# Patient Record
Sex: MALE | Race: WHITE
[De-identification: names, ages, dates, MRNs, and addresses within clinical notes are randomized per-mention and may not be internally consistent; named-entity substitution may affect disease eponyms.]

---

## 2017-06-11 NOTE — ER DOCUMENT REPORT
ED Medical Screen (RME)





- General


TRAVEL OUTSIDE OF THE [place] IN LAST 30 DAYS: No





<ALOK BENITEZ - Last Filed: 06/11/17 19:09>





<FLO BEAVERS - Last Filed: 06/11/17 20:33>





- General


Chief Complaint: Shortness Of Breath


Stated Complaint: SHORTNESS OF BREATH


Time Seen by Provider: 06/11/17 19:02


Notes: 


Patient says he has been having difficulty with his breathing for about a 

month.  He took some Mucinex cough medication and after couple weeks that 

problem cleared up.  Then, it came back a couple of weeks ago.  He had a cough 

and when he coughs it feels like phlegm breaks loose and then he feels like he 

is losing his breath.  He has been producing yellow phlegm initially followed 

by just clear phlegm now.  I had any fever.  Has a history of asthma but does 

not feel he is having asthma attacks.  Patient does not smoke.  Patient says he 

has had some intermittent ankle swelling bilaterally, but it has preceded his 

current illness.  Denies any leg pain or history of blood clots.  Patient 

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  Has acid 

reflux. (ALOK BENITEZ)





- Related Data


Allergies/Adverse Reactions: 


 





No Known Allergies Allergy (Verified 06/11/17 18:18)


 











Past Medical History


Renal/ Medical History: Denies: Hx Peritoneal Dialysis


GI Medical History: Reports: Hx Gastroesophageal Reflux Disease


Musculoskeltal Medical History: Reports Hx Musculoskeletal Trauma


Traumatic Medical History: Reports: Hx Fractures


Past Surgical History: Reports: Hx Orthopedic Surgery





- Immunizations


Immunizations up to date: Yes


Hx Diphtheria, Pertussis, Tetanus Vaccination: Yes





<ALOK BENITEZ - Last Filed: 06/11/17 19:09>





Course





- Laboratory


Result Diagrams: 


 06/11/17 19:30





 06/11/17 19:30





<FLO BEAVERS - Last Filed: 06/11/17 20:33>





- Vital Signs


Vital signs: 





 











Temp Pulse Resp BP Pulse Ox


 


 97.6 F   95   14   153/95 H  96 


 


 06/11/17 18:18  06/11/17 18:18  06/11/17 18:18  06/11/17 18:18  06/11/17 18:18














- Laboratory


Laboratory results interpreted by me: 





 











  06/11/17





  19:30


 


WBC  10.8 H














Doctor's Discharge





<ALOK BENITEZ - Last Filed: 06/11/17 19:09>





<FLO BEAVERS - Last Filed: 06/11/17 20:33>





- Discharge


Clinical Impression: 


 Cough, Shortness of breath, Postnasal drip





Condition: Stable


Disposition: HOME, SELF-CARE


Additional Instructions: 


No pneumonia is seen on x-ray.  Examination is consistent with an upper 

respiratory source of your symptoms along with postnasal drip.


Because of ongoing symptoms we are treating you with a course of azithromycin 

in addition to the prednisone for suspected bronchitis source.  Also consider 

antiallergy such as Zyrtec or Allegra over-the-counter for postnasal drip and 

your symptoms.  Follow-up with primary care.  Return to emergency department 

for concerning or worsening symptoms including rapid or labored breathing, 

worsening shortness of breath, spiking fever, chest pain, or any other 

concerning symptoms.





Prescriptions: 


Azithromycin [Zithromax 250 mg Tablet] 250 mg PO ASDIR PRN #6 tablet


 PRN Reason: 


Prednisone [Deltasone 10 mg Tablet] 10 mg PO ASDIR PRN #21 tablet


 PRN Reason:

## 2017-06-11 NOTE — RADIOLOGY REPORT (SQ)
EXAM DESCRIPTION:  CHEST PA/LAT



COMPLETED DATE/TIME:  6/11/2017 7:42 pm



REASON FOR STUDY:  Cough and feeling short of breath.



COMPARISON:  None.



NUMBER OF VIEWS:  Two view.



TECHNIQUE:  Frontal and lateral radiographic views of the chest acquired.



LIMITATIONS:  None.



FINDINGS:  LUNGS AND PLEURA: Low lung volumes.  No opacities, masses or pneumothorax.  No pleural eff
usion.

MEDIASTINUM AND HILAR STRUCTURES: No masses.  No contour abnormalities.

HEART AND VASCULAR STRUCTURES: Heart normal in size and contour.  No evidence for failure.

BONES: No acute findings.

HARDWARE: None in the chest.

OTHER: No other significant finding.



IMPRESSION:  LOW LUNG VOLUMES.  NO SIGNIFICANT RADIOGRAPHIC FINDING IN THE CHEST.



TECHNICAL DOCUMENTATION:  JOB ID:  [id number]

 2011 [employer]- All Rights Reserved

## 2017-06-12 NOTE — ER DOCUMENT REPORT
ED General





- General


Chief Complaint: Shortness Of Breath


Stated Complaint: SHORTNESS OF BREATH


Time Seen by Provider: 06/11/17 19:02


Notes: 


Patient is a 36-year-old male who comes emergency department for chief 

complaint of difficulty breathing, symptoms started almost 1 month ago, he 

states that he then improved but then 2 weeks ago his symptoms returned.  He 

states he has coughing episodes and today during the coughing episodes he felt 

more short of breath than usual.  He denies any fever, he states he had yellow 

phlegm but now he is just coughing up clear phlegm.  He does not smoke.  He 

states he has a history of asthma in the past.  He states that he has noticed 

his legs swelling on both sides, however this started before his cough and 

shortness of breath.  He denies any history or family history of blood clots.  

He denies any recent surgery or long distance travel.  He is a  but 

does short trips only.


TRAVEL OUTSIDE OF THE [place] IN LAST 30 DAYS: No





- Related Data


Allergies/Adverse Reactions: 


 





No Known Allergies Allergy (Verified 06/11/17 18:18)


 











Past Medical History





- General


Information source: Patient





- Social History


Smoking Status: Never Smoker


Frequency of alcohol use: Occasional


Drug Abuse: None


Family History: Arthritis, CAD, DM, Hyperlipidemia, Hypertension, Malignancy, 

Thyroid Disfunction


Patient has suicidal ideation: No


Patient has homicidal ideation: No


Pulmonary Medical History: Reports: Hx Asthma


Renal/ Medical History: Denies: Hx Peritoneal Dialysis


GI Medical History: Reports: Hx Gastroesophageal Reflux Disease


Musculoskeltal Medical History: Reports Hx Musculoskeletal Trauma


Traumatic Medical History: Reports: Hx Fractures


Past Surgical History: Reports: Hx Orthopedic Surgery





- Immunizations


Immunizations up to date: Yes


Hx Diphtheria, Pertussis, Tetanus Vaccination: Yes





Review of Systems





- Review of Systems


Constitutional: No symptoms reported


EENT: No symptoms reported


Cardiovascular: See HPI


Respiratory: See HPI


Gastrointestinal: No symptoms reported


Genitourinary: No symptoms reported


Male Genitourinary: No symptoms reported


Musculoskeletal: No symptoms reported


Skin: No symptoms reported


Hematologic/Lymphatic: No symptoms reported


Neurological/Psychological: No symptoms reported





Physical Exam





- Vital signs


Vitals: 


 











Temp Pulse Resp BP Pulse Ox


 


 97.6 F   95   14   153/95 H  96 


 


 06/11/17 18:18  06/11/17 18:18  06/11/17 18:18  06/11/17 18:18  06/11/17 18:18











Interpretation: Normal





- General


General appearance: Appears well


In distress: None - Patient does not appear to be in any distress





- HEENT


Head: Normocephalic, Atraumatic


Eyes: Normal


Conjunctiva: Normal


Extraocular movements intact: Yes


Eyelashes: Normal


Pupils: PERRL


Tympanic membrane: Normal


Sinus: Normal


Nasal: Normal


Mouth/Lips: Normal


Mucous membranes: Normal


Pharynx: Erythema - There is mild erythema with what appears to be postnasal 

drip


Neck: Normal





- Respiratory


Respiratory status: No respiratory distress.  No: Respiratory distress, 

Tachypnea


Chest status: Nontender.  No: Tender


Breath sounds: Nonproductive cough.  No: Decreased air movement, Wheezing


Chest palpation: Normal





- Cardiovascular


Rhythm: Regular.  No: Tachycardia


Heart sounds: Normal auscultation, S1 appreciated, S2 appreciated


Murmur: No





- Abdominal


Inspection: Normal


Distension: No distension


Bowel sounds: Normal


Tenderness: Nontender


Organomegaly: No organomegaly





- Back


Back: Normal, Nontender





- Extremities


General upper extremity: Normal inspection, Nontender, Normal color, Normal ROM

, Normal temperature


General lower extremity: Normal inspection, Nontender, Normal color, Normal ROM

, Normal temperature, Normal weight bearing.  No: Tabatha's sign





- Neurological


Neuro grossly intact: Yes


Cognition: Normal


Orientation: AAOx4


[place] Coma Scale Eye Opening: Spontaneous


[place] Coma Scale Verbal: Oriented


[place] Coma Scale Motor: Obeys Commands


[place] Coma Scale Total: 15


Speech: Normal


Motor strength normal: LUE, RUE, LLE, RLE


Sensory: Normal





- Psychological


Associated symptoms: Normal affect, Normal mood





- Skin


Skin Temperature: Warm


Skin Moisture: Dry


Skin Color: Normal





Course





- Re-evaluation


Re-evalutation: 


Laboratory workup is unremarkable, patient's physical exam shows evidence of 

postnasal drip and some irritated coughing but no other concerning 

abnormalities.  No lower extremity swelling.  Very low suspicion of PE based on 

no long distance travel, no smoking, no lower extremity swelling, no history of 

blood clot with patient for family members, and no surgery.  Patient not 

currently reporting any shortness of breath.  Because symptoms have been going 

on greater than 1 week I did agree to cover with antibiotic although discussed 

with patient that this is very likely bronchitis.  Discussed follow-up with 

primary care, discussed return precautions, patient states understanding and 

agreement.





- Vital Signs


Vital signs: 


 











Temp Pulse Resp BP Pulse Ox


 


 98.0 F   85   18   145/93 H  97 


 


 06/11/17 20:42  06/11/17 20:42  06/11/17 20:42  06/11/17 20:42  06/11/17 20:42














- Laboratory


Result Diagrams: 


 06/11/17 19:30





 06/11/17 19:30


Laboratory results interpreted by me: 


 











  06/11/17





  19:30


 


WBC  10.8 H














Discharge





- Discharge


Clinical Impression: 


 Cough, Shortness of breath, Postnasal drip





Condition: Stable


Disposition: HOME, SELF-CARE


Additional Instructions: 


No pneumonia is seen on x-ray.  Examination is consistent with an upper 

respiratory source of your symptoms along with postnasal drip.


Because of ongoing symptoms we are treating you with a course of azithromycin 

in addition to the prednisone for suspected bronchitis source.  Also consider 

antiallergy such as Zyrtec or Allegra over-the-counter for postnasal drip and 

your symptoms.  Follow-up with primary care.  Return to emergency department 

for concerning or worsening symptoms including rapid or labored breathing, 

worsening shortness of breath, spiking fever, chest pain, or any other 

concerning symptoms.





Prescriptions: 


Azithromycin [Zithromax 250 mg Tablet] 250 mg PO ASDIR PRN #6 tablet


 PRN Reason: 


Prednisone [Deltasone 10 mg Tablet] 10 mg PO ASDIR PRN #21 tablet


 PRN Reason: 


Forms:  Elevated Blood Pressure

## 2017-06-12 NOTE — ER DOCUMENT REPORT
ED Respiratory Problem





- General


Chief Complaint: Shortness Of Breath


Stated Complaint: DIFFICULTY BREATHING


Time Seen by Provider: 06/12/17 12:15


Mode of Arrival: Ambulatory


Information source: Patient


TRAVEL OUTSIDE OF THE [place] IN LAST 30 DAYS: No





- HPI


Patient complains to provider of: Cough, Short of breath


Onset: Other - 1 month


Quality of pain: No pain


Severity: Mild


Short of Breath: Mild


Chest pain/discomfort: Tightness, Worse with deep breaths


Cough: Productive


Sputum amount: Small


Sputum color: Clear


Sputum consistency: Mucoid


Associated symptoms: Congestion, Cough, Short of breath


Similar symptoms previously: Yes


Recently seen / treated by doctor: Yes


Notes: 


Patient is a 36-year-old male who presents back to the emergency room today for 

complaints of shortness of breath with chest tightness and a cough that has 

been persistent for 1 month, the cough is occasionally productive of clear 

phlegm, there is been no fever, no injury, he does report the back of his 

throat feels dry and he has had nasal congestion as well, he was seen in this 

emergency room yesterday for these complaints, was diagnosed with likely 

bronchitis and started on antibiotics which she has been unable to fill yet as 

he was discharged late yesterday evening and the pharmacies were closed, he 

reports feeling no change in symptoms since being seen last night, patient 

reports that at some point in time during his evaluation the possibility of a 

pulmonary embolus was discussed with him and now he is concerned that that 

could be a possibility for his symptoms, he is not a smoker he does work as a 

 traveling up to 4 hours at a time but does get out of the vehicle 

once he arrives at his destination, denies any leg pain or calf pain, no 

history of DVTs previously or PE





- Related Data


Allergies/Adverse Reactions: 


 





No Known Allergies Allergy (Verified 06/12/17 12:01)


 











Past Medical History





- General


Information source: Patient





- Social History


Smoking Status: Never Smoker


Family History: Arthritis, CAD, DM, Hyperlipidemia, Hypertension, Malignancy, 

Thyroid Disfunction


Patient has suicidal ideation: No


Patient has homicidal ideation: No


Pulmonary Medical History: Reports: Hx Asthma


Renal/ Medical History: Denies: Hx Peritoneal Dialysis


GI Medical History: Reports: Hx Gastroesophageal Reflux Disease


Musculoskeltal Medical History: Reports Hx Musculoskeletal Trauma


Traumatic Medical History: Reports: Hx Fractures


Past Surgical History: Reports: Hx Orthopedic Surgery





- Immunizations


Immunizations up to date: Yes


Hx Diphtheria, Pertussis, Tetanus Vaccination: Yes





Review of Systems





- Review of Systems


Constitutional: No symptoms reported


EENT: No symptoms reported


Cardiovascular: No symptoms reported


Respiratory: See HPI


Gastrointestinal: No symptoms reported


Genitourinary: No symptoms reported


Male Genitourinary: No symptoms reported


Musculoskeletal: No symptoms reported


Skin: No symptoms reported


Hematologic/Lymphatic: No symptoms reported


Neurological/Psychological: No symptoms reported


-: Yes All other systems reviewed and negative





Physical Exam





- Vital signs


Vitals: 


 











Temp Pulse Resp BP Pulse Ox


 


 98.7 F   91   20   161/104 H  98 


 


 06/12/17 12:01 06/12/17 12:01 06/12/17 12:01 06/12/17 12:01 06/12/17 12:01











Interpretation: Normal





- General


General appearance: Appears well, Alert





- HEENT


Head: Normocephalic, Atraumatic


Eyes: Normal


Pupils: PERRL





- Respiratory


Respiratory status: No respiratory distress.  No: Respiratory distress


Chest status: Nontender


Breath sounds: Normal.  No: Decreased air movement


Chest palpation: Normal





- Cardiovascular


Rhythm: Regular


Heart sounds: Normal auscultation


Murmur: No





- Abdominal


Inspection: Normal, Morbidly Obese


Distension: No distension


Bowel sounds: Normal


Tenderness: Nontender


Organomegaly: No organomegaly





- Back


Back: Normal, Nontender





- Extremities


General upper extremity: Normal inspection, Nontender, Normal color, Normal ROM

, Normal temperature


General lower extremity: Normal inspection, Nontender, Normal color, Normal ROM

, Normal temperature, Normal weight bearing.  No: Tabatha's sign


Calf: Nontender





- Neurological


Neuro grossly intact: Yes


Cognition: Normal


Orientation: AAOx4


[place] Coma Scale Eye Opening: Spontaneous


[place] Coma Scale Verbal: Oriented


[place] Coma Scale Motor: Obeys Commands


[place] Coma Scale Total: 15


Speech: Normal


Motor strength normal: LUE, RUE, LLE, RLE


Sensory: Normal





- Psychological


Associated symptoms: Normal affect, Normal mood





- Skin


Skin Temperature: Warm


Skin Moisture: Dry


Skin Color: Normal





Course





- Vital Signs


Vital signs: 


 











Temp Pulse Resp BP Pulse Ox


 


 98.7 F   91   20   161/104 H  98 


 


 06/12/17 12:01  06/12/17 12:01 06/12/17 12:01 06/12/17 12:01 06/12/17 12:01














- EKG Interpretation by Me


EKG shows normal: Sinus rhythm


Rate: Normal


Rhythm: NSR





Discharge





- Discharge


Clinical Impression: 


 Bronchitis





Condition: Stable


Disposition: HOME, SELF-CARE


Instructions:  Bronchitis (Critical access hospital)


Additional Instructions: 


Follow up with your primary care provider in one to 2 days.  Return to the 

emergency room immediately if symptoms worsen or any additional concerns.


Forms:  Return to Work

## 2018-01-16 NOTE — ER DOCUMENT REPORT
ED GI/





- General


Chief Complaint: Abdominal Pain


Stated Complaint: LOWER ABDOMINAL PAIN


Time Seen by Provider: 01/16/18 16:40


Mode of Arrival: Ambulatory


Notes: 


Patient is a 37-year-old male comes emergency department for chief complaint of 

lower abdominal pain mainly in his right side.  He states that he is having an 

intermittent cramping feelings since this morning, he had occasional sharp 

stabbing pain in the area.  He states he had pain and pressure in the area when 

he had a couple bowel movements earlier.  He denies any current pain.  Denies 

nausea vomiting, fever or chills, hematuria, flank pain, or trauma.  Past 

medical history of obesity, GERD, orthopedic surgery.





TRAVEL OUTSIDE OF THE [place] IN LAST 30 DAYS: No





- Related Data


Allergies/Adverse Reactions: 


 





No Known Allergies Allergy (Verified 01/16/18 14:56)


 











Past Medical History





- General


Information source: Patient





- Social History


Smoking Status: Never Smoker


Chew tobacco use (# tins/day): No


Frequency of alcohol use: Occasional


Drug Abuse: None


Lives with: Family


Family History: Arthritis, CAD, DM, Hyperlipidemia, Hypertension, Malignancy, 

Thyroid Disfunction


Patient has suicidal ideation: No


Patient has homicidal ideation: No


Pulmonary Medical History: Reports: Hx Asthma


Renal/ Medical History: Denies: Hx Peritoneal Dialysis


GI Medical History: Reports: Hx Gastroesophageal Reflux Disease


Musculoskeltal Medical History: Reports Hx Musculoskeletal Trauma


Traumatic Medical History: Reports: Hx Fractures


Past Surgical History: Reports: Hx Orthopedic Surgery





- Immunizations


Immunizations up to date: Yes


Hx Diphtheria, Pertussis, Tetanus Vaccination: Yes





Review of Systems





- Review of Systems


Constitutional: No symptoms reported


EENT: No symptoms reported


Cardiovascular: No symptoms reported


Respiratory: No symptoms reported


Gastrointestinal: See HPI


Genitourinary: No symptoms reported


Male Genitourinary: No symptoms reported


Musculoskeletal: No symptoms reported


Skin: No symptoms reported


Hematologic/Lymphatic: No symptoms reported


Neurological/Psychological: No symptoms reported





Physical Exam





- Vital signs


Vitals: 


 











Temp Pulse Resp BP Pulse Ox


 


 98.5 F   101 H  18   185/97 H  97 


 


 01/16/18 15:05  01/16/18 15:05  01/16/18 15:05  01/16/18 15:05  01/16/18 15:05











Interpretation: Normal





- General


General appearance: Appears well, Alert


In distress: None - Patient alert, well-appearing, conversational





- HEENT


Head: Normocephalic, Atraumatic


Eyes: Normal


Pupils: PERRL





- Respiratory


Respiratory status: No respiratory distress


Chest status: Nontender


Breath sounds: Normal


Chest palpation: Normal





- Cardiovascular


Rhythm: Regular


Heart sounds: Normal auscultation


Murmur: No





- Abdominal


Inspection: Normal


Distension: No distension


Bowel sounds: Normal


Tenderness: Nontender - Completely nontender abdomen, soft, no rebound 

tenderness, no rigidity.  No: Tender, McBurney's point, Guarding





- Back


Back: Normal, Nontender.  No: Tender, CVA tenderness





- Extremities


General upper extremity: Normal inspection, Nontender, Normal color, Normal ROM

, Normal temperature


General lower extremity: Normal inspection, Nontender, Normal color, Normal ROM

, Normal temperature, Normal weight bearing.  No: Tabatha's sign





- Neurological


Neuro grossly intact: Yes


Cognition: Normal


Orientation: AAOx4


[place] Coma Scale Eye Opening: Spontaneous


[place] Coma Scale Verbal: Oriented


[place] Coma Scale Motor: Obeys Commands


[place] Coma Scale Total: 15


Speech: Normal


Motor strength normal: LUE, RUE, LLE, RLE


Sensory: Normal





- Psychological


Associated symptoms: Normal affect, Normal mood





- Skin


Skin Temperature: Warm


Skin Moisture: Dry


Skin Color: Normal





Course





- Re-evaluation


Re-evalutation: 


Patient is alert and well-appearing on my exam.  He has a soft nontender 

abdomen.  CBC shows minimal leukocytosis with no elevation of neutrophils or 

bandemia.  Nonspecific.  Chemistry unremarkable.  Urinalysis unremarkable with 

no hematuria or infection.  CAT scan had already been ordered and performed 

from triage, this was reviewed.  Shows fatty infiltration of the liver, normal 

appendix, normal bowel, no acute abnormality.  I feel this is consistent with 

patient's examination.  Symptoms have been intermittent, no vomiting, no fever, 

patient currently asymptomatic and has a very unremarkable abdominal exam.  I 

have very low suspicion of an acute abdominal surgical abnormality.  Unsure of 

patient's exact etiology at this time.  Discussed results including fatty 

liver.  Patient will be discharged with strict return precautions, I discussed 

these, these were provided with his discharge instructions, patient states 

understanding and agreement.





- Vital Signs


Vital signs: 


 











Temp Pulse Resp BP Pulse Ox


 


 98.0 F   89   19   151/54 H  94 


 


 01/16/18 19:09  01/16/18 19:09  01/16/18 19:09  01/16/18 19:09  01/16/18 19:09














- Laboratory


Result Diagrams: 


 01/16/18 16:53





 01/16/18 16:53


Laboratory results interpreted by me: 


 











  01/16/18





  16:53


 


WBC  11.8 H














Discharge





- Discharge


Clinical Impression: 


 Right lower quadrant pain





Condition: Stable


Disposition: HOME, SELF-CARE


Additional Instructions: 


Your examination, lab work, and CAT scan did not show any concerning 

abnormality at this time.  Increase fluids, eat plenty of fiber, follow-up with 

primary care. Return to the ED if you worsen, see additional instructions 

below. 


_________________________





Observation for Appendicitis





     At this time, the abdominal pain does not seem to be appendicitis. Our 

next "test" will be passage of time.  If you have early appendicitis, signs 

will appear to help us make the diagnosis.


     Most of the time, the pain goes away.  


     Unless the pain is gone, you should come back for a recheck.  This is 

usually done in 8 to 12 hours.  Be sure you understand your follow-up 

instructions.


     Come back immediately if: 


(1) the pain becomes much more severe and sharply increases with movement or 

coughing, 


(2) vomiting becomes frequent, 


(3) there is blood in the vomit, urine, or bowel movements, 


(4) there are shaking chills or fever, or 


(5) the abdomen becomes more distended or swollen.

## 2018-01-16 NOTE — RADIOLOGY REPORT (SQ)
EXAM DESCRIPTION:  CT ABD/PELVIS WITH IV ONLY



COMPLETED DATE/TIME:  1/16/2018 6:43 pm



REASON FOR STUDY:  rlq abd pain



COMPARISON:  None.



TECHNIQUE:  CT scan of the abdomen and pelvis performed using helical scanning technique with dynamic
 intravenous contrast injection.  No oral contrast. Images reviewed with lung, soft tissue, and bone 
windows. Reconstructed coronal and sagittal MPR images reviewed. Delayed images for evaluation of the
 urinary system also acquired. All images stored on PACS.

All CT scanners at this facility use dose modulation, iterative reconstruction, and/or weight based d
osing when appropriate to reduce radiation dose to as low as reasonably achievable (ALARA).

[facility]: Dose Right  [facility]: CareDose    [facility]: Dose Right    [facility]: Teradose 4D    [facility]: Smart Technologies



CONTRAST TYPE AND DOSE:  contrast/concentration: Isovue 370.00 mg/ml; Total Contrast Delivered: 100.0
 ml; Total Saline Delivered: 45.1 ml



RENAL FUNCTION:  None required. The patient is less than 50 years old.



RADIATION DOSE:  CT Rad equipment meets quality standard of care and radiation dose reduction techniq
ues were employed. CTDIvol: 30.0 mGy. DLP: 3723 mGy-cm..



LIMITATIONS:  None.



FINDINGS:  LOWER CHEST: No significant findings. No nodules or infiltrates.

LIVER: Normal size. No masses.  No dilated ducts.  There is fatty infiltration of the liver.

SPLEEN: Normal size. No focal lesions.

PANCREAS: No masses. No significant calcifications. No adjacent inflammation or peripancreatic fluid 
collections. Pancreatic duct not dilated.

GALLBLADDER: No identified stones by CT criteria. No inflammatory changes to suggest cholecystitis.

ADRENAL GLANDS: No significant masses or asymmetry.

RIGHT KIDNEY AND URETER: No solid masses.   No significant calcifications.   No hydronephrosis or hyd
roureter.

LEFT KIDNEY AND URETER: No solid masses.   No significant calcifications.   No hydronephrosis or hydr
oureter.

AORTA AND VESSELS: No aneurysm. No dissection. Renal arteries, SMA, celiac without stenosis.

RETROPERITONEUM: No retroperitoneal adenopathy, hemorrhage or masses.

BOWEL AND PERITONEAL CAVITY: No masses or inflammatory changes. No free fluid or peritoneal masses.

APPENDIX: Normal.

PELVIS: No mass.  No free fluid. Normal bladder.

ABDOMINAL WALL: No masses. No hernias.

BONES: No significant or acute findings.

OTHER: No other significant finding.



IMPRESSION:  NO SIGNIFICANT OR ACUTE FINDING IN THE ABDOMEN OR PELVIS ON CT SCAN WITH IV CONTRAST.



TECHNICAL DOCUMENTATION:  JOB ID:  [id number]

Quality ID # [id number]: Final reports with documentation of one or more dose reduction techniques (e.g., Au
tomated exposure control, adjustment of the mA and/or kV according to patient size, use of iterative 
reconstruction technique)

 2011 [employer]- All Rights Reserved

## 2018-01-16 NOTE — ER DOCUMENT REPORT
ED Medical Screen (RME)





- General


Chief Complaint: Abdominal Pain


Stated Complaint: LOWER ABDOMINAL PAIN


Time Seen by Provider: 01/16/18 16:40


Mode of Arrival: Ambulatory


Information source: Patient


Notes: 





36 yo morbidly obese male with RLQ abd. pain that gets worse with bm's today, 

and after 2 sandwichs at lunch. Onset 0400. No hx stones. No fever. No groin  

or testiculat pain. 


TRAVEL OUTSIDE OF THE [place] IN LAST 30 DAYS: No





- Related Data


Allergies/Adverse Reactions: 


 





No Known Allergies Allergy (Verified 01/16/18 14:56)


 











Past Medical History





- Social History


Chew tobacco use (# tins/day): No


Frequency of alcohol use: Occasional


Drug Abuse: None


Pulmonary Medical History: Reports: Hx Asthma


Renal/ Medical History: Denies: Hx Peritoneal Dialysis


GI Medical History: Reports: Hx Gastroesophageal Reflux Disease


Musculoskeltal Medical History: Reports Hx Musculoskeletal Trauma


Traumatic Medical History: Reports: Hx Fractures


Past Surgical History: Reports: Hx Orthopedic Surgery





- Immunizations


Immunizations up to date: Yes


Hx Diphtheria, Pertussis, Tetanus Vaccination: Yes





Physical Exam





- Vital signs


Vitals: 





 











Temp Pulse Resp BP Pulse Ox


 


 98.5 F   101 H  18   185/97 H  97 


 


 01/16/18 15:05  01/16/18 15:05  01/16/18 15:05  01/16/18 15:05  01/16/18 15:05














Course





- Vital Signs


Vital signs: 





 











Temp Pulse Resp BP Pulse Ox


 


 98.5 F   101 H  18   185/97 H  97 


 


 01/16/18 15:05  01/16/18 15:05  01/16/18 15:05  01/16/18 15:05  01/16/18 15:05

## 2018-03-14 NOTE — ER DOCUMENT REPORT
ED Skin Rash/Insect Bite/Abscs





- General


Chief Complaint: Skin Problem


Stated Complaint: SKIN PROBLEM


Time Seen by Provider: 03/14/18 21:06


Information source: Patient


TRAVEL OUTSIDE OF THE [place] IN LAST 30 DAYS: No





- HPI


Patient complains to provider of: Skin rash/lesion


Notes: 





Patient is here with complaints of rash.  He states that he was pulling weeds 

and working in his garden on Sunday and Monday started at some itching to his 

arms.  Yesterday he noticed some rash to his arms.  Today he noticed some rash 

under his eyes.  No blurred or loss vision.  No pain.  No fever.  No nausea, 

vomiting, diarrhea.  Chest pain or shortness of breath.  He denies any new soaps

, detergents, lotions, medications.  He has no other complaints.





- Related Data


Allergies/Adverse Reactions: 


 





No Known Allergies Allergy (Verified 01/16/18 14:56)


 











Past Medical History





- Social History


Smoking Status: Never Smoker


Chew tobacco use (# tins/day): No


Frequency of alcohol use: None


Drug Abuse: None


Family History: Arthritis, CAD, DM, Hyperlipidemia, Hypertension, Malignancy, 

Thyroid Disfunction


Patient has suicidal ideation: No


Patient has homicidal ideation: No


Pulmonary Medical History: Reports: Hx Asthma


Renal/ Medical History: Denies: Hx Peritoneal Dialysis


GI Medical History: Reports: Hx Gastroesophageal Reflux Disease


Musculoskeltal Medical History: Reports Hx Musculoskeletal Trauma


Traumatic Medical History: Reports: Hx Fractures


Past Surgical History: Reports: Hx Orthopedic Surgery





- Immunizations


Immunizations up to date: Yes


Hx Diphtheria, Pertussis, Tetanus Vaccination: Yes





Review of Systems





- Review of Systems


-: Yes All other systems reviewed and negative





Physical Exam





- Vital signs


Vitals: 





 











Temp Pulse Resp BP Pulse Ox


 


 98.4 F   76   20   156/96 H  96 


 


 03/14/18 20:55  03/14/18 20:55  03/14/18 20:55  03/14/18 20:55  03/14/18 20:55














- Notes


Notes: 





GENERAL: alert, cooperative, nontoxic, no distress.


HEAD: normocephalic, atraumatic


EYES: conjunctiva pink without discharge, mild redness and swelling to the 

lower lids.  No tenderness.


EARS: no external swelling, no external redness


NOSE: atraumatic, no external swelling


MOUTH/THROAT: mucous membranes moist and pink


NECK: soft, supple, full range of motion, no meningismus.


CHEST: no distress, lungs clear and equal throughout.  No wheezing, rales, 

rhonchi.


CARDIAC: regular rate and rhythm, no murmur, normal capillary refill, normal 

pulses.  


BACK: full range of motion, no CVA tenderness.


EXTREMITIES: full range of motion of all extremities.  No redness, no swelling.


NEURO: alert and oriented 3, no focal deficits, full range of motion of all 

extremities.


PYSCH: appropriate mood, affect.  Patient is cooperative.


SKIN: pink, warm, dry, nonspecific papular rash to the bilateral arms with 

vesicular linear rash to the bilateral arms.  No surrounding erythema or 

tenderness.  Mild erythema and swelling to the bilateral lower eyelids..








Course





- Re-evaluation


Re-evalutation: 





03/14/18 22:11


Patient is nontoxic appearing with stable vitals.  The patient was working in 

his garden on Sunday developed itching Monday and rash Tuesday he now has a 

rash to his arms and under his eyelids.  Rashes consistent with contact 

dermatitis.  There is no signs of secondary infection.  Patient is in no 

respiratory distress.  Remainder of his exam exam unremarkable.  He does not 

have diabetes.  Due to the fact that is involving his eyes, I will place him on 

oral steroid taper.  He was instructed to do over-the-counter antihistamines.  

Follow-up if not improving in the next 5-7 days, sooner for worsening symptoms, 

high fever, pain, drainage, any further concerns.





The patient's emergency department workup and current diagnosis were explained 

to the patient and or family.  Follow-up instructions were provided.  

Medications if prescribed were discussed. Instructions for when to return to 

the emergency department including specific  worrisome symptoms were discussed 

with the patient and/or family.





The patient is noted to have elevated blood pressure during today's emergency 

department visit.  The patient was informed of this finding.  The patient was 

instructed that this may be related to pre-hypertension and requires further 

evaluation with a primary care provider.  The patient has no hypertensive 

symptoms at this time.





- Vital Signs


Vital signs: 





 











Temp Pulse Resp BP Pulse Ox


 


 98.4 F   76   20   156/96 H  96 


 


 03/14/18 20:55  03/14/18 20:55  03/14/18 20:55  03/14/18 20:55  03/14/18 20:55














Discharge





- Discharge


Clinical Impression: 


Contact dermatitis


Qualifiers:


 Contact dermatitis type: allergic Contact dermatitis trigger: non-food plants 

Qualified Code(s): L23.7 - Allergic contact dermatitis due to plants, except 

food





Condition: Stable


Disposition: HOME, SELF-CARE


Instructions:  Contact Dermatitis ([facility])


Additional Instructions: 


Take medications as prescribed.  Take over-the-counter antihistamine such as 

Claritin or Benadryl to help with itching.  Keep rash clean and dry.  Follow-up 

if not better in 5-7 days, sooner for worsening symptoms, high fever, redness, 

drainage, pain, difficulty breathing or swallowing, or for any further concerns.





Your blood pressure was elevated during today's visit.  Have this rechecked 

with your doctor.


Prescriptions: 


Prednisone 5 mg PO ASDIR 18 Days  tab.ds.pk


Forms:  Elevated Blood Pressure, Smoking Cessation Education


Referrals: 


[facility] [Provider Group] - Follow up as needed

## 2018-03-20 NOTE — ER DOCUMENT REPORT
ED Skin Rash/Insect Bite/Abscs





- General


Chief Complaint: Rash


Stated Complaint: POSSIBLE RASH


Time Seen by Provider: 03/20/18 17:19


Mode of Arrival: Ambulatory


Information source: Patient


TRAVEL OUTSIDE OF THE [place] IN LAST 30 DAYS: No





- HPI


Patient complains to provider of: Skin rash/lesion


Notes: 





Patient is here with complaints of rash.  He was seen here a few days ago with 

a rash to his arms and under his eyes and was placed on prednisone for possible 

contact dermatitis as he tells me he was cleaning out tashia in the garden just 

prior to the rash starting.  He has been on a prednisone taper but states the 

rash seems to be getting worse.  Rashes now spread to his hands his abdomen and 

up his arms.  His eye swelling has improved.  States the rash is very itchy.  

Seems to be spreading.  No fevers.  No nausea, vomiting, diarrhea.  No 

difficulty breathing or swallowing.  No chest pain or shortness of breath.  No 

known sick contacts.  No new soaps, detergents, lotions, medications.  No other 

complaints.





- Related Data


Allergies/Adverse Reactions: 


 





No Known Allergies Allergy (Verified 03/20/18 16:17)


 











Past Medical History





- Social History


Smoking Status: Never Smoker


Chew tobacco use (# tins/day): No


Frequency of alcohol use: Occasional


Drug Abuse: None


Family History: Arthritis, CAD, DM, Hyperlipidemia, Hypertension, Malignancy, 

Thyroid Disfunction


Patient has suicidal ideation: No


Patient has homicidal ideation: No


Pulmonary Medical History: Reports: Hx Asthma


Renal/ Medical History: Denies: Hx Peritoneal Dialysis


GI Medical History: Reports: Hx Gastroesophageal Reflux Disease


Musculoskeltal Medical History: Reports Hx Musculoskeletal Trauma


Traumatic Medical History: Reports: Hx Fractures


Past Surgical History: Reports: Hx Orthopedic Surgery





- Immunizations


Immunizations up to date: Yes


Hx Diphtheria, Pertussis, Tetanus Vaccination: Yes





Review of Systems





- Review of Systems


-: Yes All other systems reviewed and negative





Physical Exam





- Vital signs


Vitals: 





 











Temp Pulse Resp BP Pulse Ox


 


 97.5 F   77   18   152/82 H  96 


 


 03/20/18 16:20  03/20/18 16:20  03/20/18 16:20  03/20/18 16:20  03/20/18 16:20














- Notes


Notes: 





GENERAL: alert, cooperative, nontoxic, no distress.


HEAD: normocephalic, atraumatic


EYES: conjunctiva pink without discharge, no external redness or swelling.


EARS: no external swelling, no external redness


NOSE: atraumatic, no external swelling


MOUTH/THROAT: mucous membranes moist and pink


NECK: soft, supple, full range of motion, no meningismus.


CHEST: no distress, lungs clear and equal throughout.  No wheezing, rales, 

rhonchi.


CARDIAC: regular rate and rhythm, no murmur, normal capillary refill, normal 

pulses.  


BACK: full range of motion, no CVA tenderness.


EXTREMITIES: full range of motion of all extremities.  No redness, no swelling.


NEURO: alert and oriented 3, no focal deficits, full range of motion of all 

extremities.


PYSCH: appropriate mood, affect.  Patient is cooperative.


SKIN: pink, warm, dry, fine red papular rash to the forearms, the dorsum of the 

hands, between the fingers, the folds of the arms, folds of the abdomen.  No 

surrounding cellulitis or redness.  No tenderness.








Course





- Re-evaluation


Re-evalutation: 





03/20/18 17:37


Patient is nontoxic appearing with stable vitals.  The patient is here with 

complaints of rash.  He was seen here a few days ago for what was possible to 

be contact dermatitis and was placed on a steroid taper.  He states that the 

rash seems to be getting worse.  He is noted to have a rash to the forearms, 

dorsum of his hands, between his fingers, the flexor portions of his arms as 

well as his abdomen.  Rashes consistent with possible scabies.  Instructed that 

he continue his prednisone taper.  I will discharge him home with Elimite.  He 

is instructed to wash everything in hot water and vacuum all furniture and 

carpet.  Continue taking Benadryl or Claritin as needed for itching.  Follow-up 

if not better in 1 week, sooner for worsening symptoms, high fever, difficult 

to breathing or swallowing, or for any further concerns.





The patient's emergency department workup and current diagnosis were explained 

to the patient and or family.  Follow-up instructions were provided.  

Medications if prescribed were discussed. Instructions for when to return to 

the emergency department including specific  worrisome symptoms were discussed 

with the patient and/or family.





The patient is noted to have elevated blood pressure during today's emergency 

department visit.  The patient was informed of this finding.  The patient was 

instructed that this may be related to pre-hypertension and requires further 

evaluation with a primary care provider.  The patient has no hypertensive 

symptoms at this time.





- Vital Signs


Vital signs: 





 











Temp Pulse Resp BP Pulse Ox


 


 97.5 F   77   18   152/82 H  96 


 


 03/20/18 16:20  03/20/18 16:20  03/20/18 16:20  03/20/18 16:20  03/20/18 16:20














Discharge





- Discharge


Clinical Impression: 


 Scabies





Condition: Stable


Disposition: HOME, SELF-CARE


Instructions:  Scabies ([facility])


Additional Instructions: 


His medication as prescribed.  Finish a prednisone taper.  Take Benadryl or 

Claritin as needed for itching.  Wash everything in hot water.  Vacuum all 

furniture and carpet.  Follow-up if not better in 1 week, sooner for worsening 

symptoms, high fever, difficulty breathing or swallowing, or for any further 

concerns.





Your blood pressure was elevated during today's visit.  Have this rechecked 

with your doctor.


Prescriptions: 


Permethrin [Elimite] 60 gm TP ONCE #120 cream.gm.


Forms:  Elevated Blood Pressure, Smoking Cessation Education


Referrals: 


[facility] [Provider Group] - Follow up as needed

## 2018-05-07 ENCOUNTER — HOSPITAL ENCOUNTER (EMERGENCY)
Dept: HOSPITAL 62 - ER | Age: 38
Discharge: HOME | End: 2018-05-07
Payer: SELF-PAY

## 2018-05-07 VITALS — SYSTOLIC BLOOD PRESSURE: 173 MMHG | DIASTOLIC BLOOD PRESSURE: 88 MMHG

## 2018-05-07 DIAGNOSIS — H60.501: Primary | ICD-10-CM

## 2018-05-07 DIAGNOSIS — R03.0: ICD-10-CM

## 2018-05-07 DIAGNOSIS — J45.909: ICD-10-CM

## 2018-05-07 DIAGNOSIS — H92.01: ICD-10-CM

## 2018-05-07 PROCEDURE — 99282 EMERGENCY DEPT VISIT SF MDM: CPT

## 2018-05-07 NOTE — ER DOCUMENT REPORT
ED ENT





- General


Chief Complaint: Ear Pain


Stated Complaint: RIGHT EAR PAIN


Time Seen by Provider: 05/07/18 17:04


Mode of Arrival: Ambulatory


Information source: Patient


TRAVEL OUTSIDE OF THE [place] IN LAST 30 DAYS: No





- HPI


Patient complains to provider of: Ear problem


Onset: Last week


Notes: 





Right ear pain for the last several days.  No injury.  No fever.  No nasal 

congestion.  No drainage.  He does not use Q-tips.  He denies any recent 

swimming.  Denies any redness or swelling around the outside of the ear.  No 

sore throat.  No difficulty breathing or swallowing.  No nausea, vomiting, 

diarrhea.  No dizziness.  No neck pain.  No other complaints at this time.  

Pain is worse with touching the ear, better with ibuprofen.  He does complain 

of some decreased hearing in this ear.








- Related Data


Allergies/Adverse Reactions: 


 





No Known Allergies Allergy (Verified 05/07/18 16:42)


 











Past Medical History





- Social History


Smoking Status: Never Smoker


Family History: Arthritis, CAD, DM, Hyperlipidemia, Hypertension, Malignancy, 

Thyroid Disfunction


Patient has suicidal ideation: No


Patient has homicidal ideation: No


Pulmonary Medical History: Reports: Hx Asthma


Renal/ Medical History: Denies: Hx Peritoneal Dialysis


GI Medical History: Reports: Hx Gastroesophageal Reflux Disease


Musculoskeltal Medical History: Reports Hx Musculoskeletal Trauma


Traumatic Medical History: Reports: Hx Fractures


Past Surgical History: Reports: Hx Orthopedic Surgery





- Immunizations


Immunizations up to date: Yes


Hx Diphtheria, Pertussis, Tetanus Vaccination: Yes





Review of Systems





- Review of Systems


-: Yes All other systems reviewed and negative





Physical Exam





- Vital signs


Vitals: 





 











Temp Pulse Resp BP Pulse Ox


 


 99.1 F   84   18   173/88 H  97 


 


 05/07/18 16:49  05/07/18 16:49  05/07/18 16:49  05/07/18 16:49  05/07/18 16:49














- Notes


Notes: 





GENERAL: alert, cooperative, nontoxic, no distress.


HEAD: normocephalic, atraumatic


EYES: conjunctiva pink without discharge, no external redness or swelling.


EARS: no external swelling, no external redness, no mastoid redness, swelling, 

tenderness.  Left canal normal.  Right ear canal with swelling and redness.  No 

drainage.  Tenderness with movement of the tragus as well as the auricle.  TMs 

pearly gray, no redness, no bulging, normal landmarks, no perforation.


NOSE: atraumatic, no external swelling. clear rhinorrhea noted.


MOUTH/THROAT: mucous membranes moist and pink, posterior pharynx without 

erythema, swelling, exudate. No trismus or drooling.


NECK: soft, supple, full range of motion, no meningismus.


CHEST: no distress, lungs clear and equal throughout.  No wheezing, rales, 

rhonchi.


CARDIAC: regular rate and rhythm, no murmur, normal capillary refill, normal 

pulses.  No peripheral edema noted.


BACK: full range of motion, no CVA tenderness.


EXTREMITIES: full range of motion of all extremities.  No redness, no swelling.


NEURO: alert and oriented A&O3, no focal deficits, full range of motion of all 

extremities.


PYSCH: appropriate mood, affect.  Patient is cooperative.


SKIN: pink, warm, dry, no rash.








Course





- Re-evaluation


Re-evalutation: 





05/07/18 17:42


Patient is nontoxic appearing with stable vitals.  Here with complaints of 

right ear pain.  On exam he has a right otitis externa.  No perforation.  No 

otitis media.  No sign of mastoiditis.  The patient will be discharged home 

with Ciprodex eardrops.  He declined needing any prescription for pain 

medication, he states that he will take Tylenol Motrin as needed for pain.  

Follow-up if not improving in the next 2 days, sooner for worsening pain, fever

, redness or swelling around the outside of the ear, or for any further 

concerns.





The patient is noted to have elevated blood pressure during today's emergency 

department visit.  The patient was informed of this finding.  The patient was 

instructed that this may be related to pre-hypertension and requires further 

evaluation with a primary care provider.  The patient has no hypertensive 

symptoms at this time.





The patient's emergency department workup and current diagnosis were explained 

to the patient and or family.  Follow-up instructions were provided.  

Medications if prescribed were discussed. Instructions for when to return to 

the emergency department including specific  worrisome symptoms were discussed 

with the patient and/or family.





- Vital Signs


Vital signs: 





 











Temp Pulse Resp BP Pulse Ox


 


 99.1 F   84   18   173/88 H  97 


 


 05/07/18 16:49  05/07/18 16:49  05/07/18 16:49  05/07/18 16:49  05/07/18 16:49














Discharge





- Discharge


Clinical Impression: 


Otitis externa


Qualifiers:


 Otitis externa type: unspecified type Chronicity: acute Laterality: right 

Qualified Code(s): H60.501 - Unspecified acute noninfective otitis externa, 

right ear





Condition: Stable


Disposition: HOME, SELF-CARE


Instructions:  Use of Ear Drops ([facility]), Otitis Externa ([facility]), Family Physicians 

/ Practices


Additional Instructions: 


Take medications as prescribed.  Take 600 mg of ibuprofen every 6 hours and 

1000 mg of Tylenol every 6 hours as needed for pain.  Follow-up if not 

improving in the next 2 days, sooner for increasing pain, fever, redness, 

swelling, drainage, persistent vomiting, redness or swelling around the outside 

of the ear, or for any further concerns.





The patient is noted to have elevated blood pressure during today's emergency 

department visit.  The patient was informed of this finding.  The patient was 

instructed that this may be related to pre-hypertension and requires further 

evaluation with a primary care provider.  The patient has no hypertensive 

symptoms at this time.





The patient's emergency department workup and current diagnosis were explained 

to the patient and or family.  Follow-up instructions were provided.  

Medications if prescribed were discussed. Instructions for when to return to 

the emergency department including specific  worrisome symptoms were discussed 

with the patient and/or family.


Prescriptions: 


Ciprofloxacin HCl/Dexameth [Ciprodex Otic Suspension 7.5 ml Bottle] 4 drop OT 

BID #1 bottle


Forms:  Elevated Blood Pressure, Smoking Cessation Education


Referrals: 


[facility] [Provider Group] - Follow up as needed

## 2018-05-28 ENCOUNTER — HOSPITAL ENCOUNTER (EMERGENCY)
Dept: HOSPITAL 62 - ER | Age: 38
Discharge: HOME | End: 2018-05-28
Payer: SELF-PAY

## 2018-05-28 VITALS — DIASTOLIC BLOOD PRESSURE: 94 MMHG | SYSTOLIC BLOOD PRESSURE: 168 MMHG

## 2018-05-28 DIAGNOSIS — R51: ICD-10-CM

## 2018-05-28 DIAGNOSIS — R09.82: ICD-10-CM

## 2018-05-28 DIAGNOSIS — J45.909: ICD-10-CM

## 2018-05-28 DIAGNOSIS — R09.81: ICD-10-CM

## 2018-05-28 DIAGNOSIS — J01.90: Primary | ICD-10-CM

## 2018-05-28 DIAGNOSIS — R03.0: ICD-10-CM

## 2018-05-28 PROCEDURE — 99283 EMERGENCY DEPT VISIT LOW MDM: CPT

## 2018-05-28 NOTE — ER DOCUMENT REPORT
HPI





- HPI


Patient complains to provider of: Left sinus pain


Onset: Other


Onset/Duration: Gradual - 5 days


Pain Level: 4


Context: 





37-year-old obese non-smoker complaining of left nasal congestion and frontal 

and maxillary sinus pain.  Some postnasal drip.  No cough.  No fever or chills.

  No history of deviated septum or chronic sinus infection.  Usually has some 

elevated blood pressure but does not take any blood pressure medication he does 

not have a primary care doctor because he does not have insurance.





Past Medical History





- General


Information source: Patient





- Social History


Smoking Status: Never Smoker


Frequency of alcohol use: None


Drug Abuse: None


Lives with: Spouse/Significant other


Family History: Arthritis, CAD, DM, Hyperlipidemia, Hypertension, Malignancy, 

Thyroid Disfunction


Pulmonary Medical History: Reports: Hx Asthma


Renal/ Medical History: Denies: Hx Peritoneal Dialysis


GI Medical History: Reports: Hx Gastroesophageal Reflux Disease


Musculoskeltal Medical History: Reports Hx Musculoskeletal Trauma


Traumatic Medical History: Reports: Hx Fractures


Past Surgical History: Reports: Hx Orthopedic Surgery





- Immunizations


Immunizations up to date: Yes


Hx Diphtheria, Pertussis, Tetanus Vaccination: Yes





Vertical Provider Document





- CONSTITUTIONAL


Agree With Documented VS: Yes


Exam Limitations: No Limitations


General Appearance: No Apparent Distress





- INFECTION CONTROL


TRAVEL OUTSIDE OF THE [place] IN LAST 30 DAYS: No





- HEENT


HEENT: Atraumatic, Normocephalic, Pharyngeal Erythema - Minimal.  negative: 

Conjuctival Injection


Notes: 





Boggy left naris with tenderness left frontal and maxillary sinus





- NECK


Neck: Supple.  negative: Lymphadenopathy-Left, Lymphadenopathy-Right





- RESPIRATORY


Respiratory: Breath Sounds Normal, No Respiratory Distress





- CARDIOVASCULAR


Cardiovascular: Regular Rate, Regular Rhythm





Course





- Re-evaluation


Re-evalutation: 





05/28/18 09:37


I had a long conversation about what the blood pressure can do to his body and 

ways to decrease calorie intake per day to lose weight.  Referred him to family 

practice doctor [facility].





- Vital Signs


Vital signs: 


 











Temp Pulse Resp BP Pulse Ox


 


 99.5 F   91   14   168/94 H  96 


 


 05/28/18 09:17  05/28/18 09:17  05/28/18 09:17  05/28/18 09:17  05/28/18 09:17














Discharge





- Discharge


Clinical Impression: 


Sinusitis


Qualifiers:


 Sinusitis location: unspecified location Chronicity: acute Recurrence: non-

recurrent Qualified Code(s): J01.90 - Acute sinusitis, unspecified





Condition: Good


Disposition: HOME, SELF-CARE


Instructions:  Augmentin ([facility]), Sinusitis ([facility]), High Blood Pressure ([facility]), 

[facility], Family Physicians / Practices


Additional Instructions: 


Continue saline nasal spray


Stop drinking sweet tea and limit the quantity of servings of food


See [facility] or family practice doctor for blood pressure 

recheck and possible medication


Warm compress to sinuses


Augmentin for 10 days


Return to the emergency room for worsening symptoms


Prescriptions: 


Amoxicillin/Potassium Clav [Augmentin 875-125 Tablet] 1 each PO BID #20 tablet

## 2018-07-07 ENCOUNTER — HOSPITAL ENCOUNTER (EMERGENCY)
Dept: HOSPITAL 62 - ER | Age: 38
Discharge: HOME | End: 2018-07-07
Payer: SELF-PAY

## 2018-07-07 VITALS — SYSTOLIC BLOOD PRESSURE: 156 MMHG | DIASTOLIC BLOOD PRESSURE: 94 MMHG

## 2018-07-07 DIAGNOSIS — H60.92: Primary | ICD-10-CM

## 2018-07-07 PROCEDURE — 99282 EMERGENCY DEPT VISIT SF MDM: CPT

## 2018-07-07 NOTE — ER DOCUMENT REPORT
HPI





- HPI


Patient complains to provider of: left ear pain


Onset: Last week


Onset/Duration: Persistent, Worse


Pain Level: 3


Context: 





36 yo male c/o pain left ear. Itches at times. no recent URI.


Exacerbated by: Denies


Relieved by: Denies


Similar symptoms previously: No


Recently seen / treated by doctor: No





- ROS


ROS below otherwise negative: Yes


Systems Reviewed and Negative: Yes All other systems reviewed and negative





- CONSTITUTIONAL


Constitutional: DENIES: Fever, Chills





- EENT


EENT: REPORTS: Ear Pain - LEFT





Past Medical History





- General


Information source: Patient





- Social History


Smoking Status: Never Smoker


Chew tobacco use (# tins/day): No


Frequency of alcohol use: None


Drug Abuse: None


Lives with: Spouse/Significant other


Family History: Arthritis, CAD, DM, Hyperlipidemia, Hypertension, Malignancy, 

Thyroid Disfunction


Patient has suicidal ideation: No


Patient has homicidal ideation: No


Pulmonary Medical History: Reports: Hx Asthma


Renal/ Medical History: Denies: Hx Peritoneal Dialysis


GI Medical History: Reports: Hx Gastroesophageal Reflux Disease


Musculoskeltal Medical History: Reports Hx Musculoskeletal Trauma


Traumatic Medical History: Reports: Hx Fractures


Past Surgical History: Reports: Hx Orthopedic Surgery - LEFT COLLARBONE





- Immunizations


Immunizations up to date: Yes


Hx Diphtheria, Pertussis, Tetanus Vaccination: Yes





Vertical Provider Document





- CONSTITUTIONAL


Agree With Documented VS: Yes


Exam Limitations: No Limitations


General Appearance: No Apparent Distress





- INFECTION CONTROL


TRAVEL OUTSIDE OF THE [place] IN LAST 30 DAYS: No





- HEENT


HEENT: negative: Tympanic Membrane Red


Notes: 





mld red tender left ear canal, no swelling





- NECK


Neck: Supple.  negative: Lymphadenopathy-Left, Lymphadenopathy-Right





- RESPIRATORY


Respiratory: Breath Sounds Normal, No Respiratory Distress





- CARDIOVASCULAR


Cardiovascular: Regular Rate, Regular Rhythm





- NEURO


Level of Consciousness: Alert





- DERM


Integumentary: No Rash





Course





- Vital Signs


Vital signs: 


 











Temp Pulse Resp BP Pulse Ox


 


 98.7 F   79   18   156/94 H  97 


 


 07/07/18 08:38  07/07/18 08:38  07/07/18 08:38  07/07/18 08:38  07/07/18 08:38














Discharge





- Discharge


Clinical Impression: 


 Mild left otitis externa





Condition: Good


Disposition: HOME, SELF-CARE


Instructions:  Use of Ear Drops ([facility]), Otitis Externa ([facility])


Additional Instructions: 


ear drops 4 times a day


Return to the emergency room if symptoms worsen


Prescriptions: 


Gentamicin Sulfate 2 drp AS QID #5 ml

## 2019-08-26 ENCOUNTER — HOSPITAL ENCOUNTER (EMERGENCY)
Dept: HOSPITAL 62 - ER | Age: 39
Discharge: HOME | End: 2019-08-26
Payer: SELF-PAY

## 2019-08-26 VITALS — DIASTOLIC BLOOD PRESSURE: 88 MMHG | SYSTOLIC BLOOD PRESSURE: 181 MMHG

## 2019-08-26 DIAGNOSIS — H93.8X2: Primary | ICD-10-CM

## 2019-08-26 DIAGNOSIS — J45.909: ICD-10-CM

## 2019-08-26 PROCEDURE — 99282 EMERGENCY DEPT VISIT SF MDM: CPT

## 2019-08-26 NOTE — ER DOCUMENT REPORT
HPI





- HPI


Patient complains to provider of: Ear abscess


Time Seen by Provider: 08/26/19 09:11


Onset: Other - 3Days


Quality of pain: Achy


Pain Level: 3


Context: 





This 38-year-old male presents emergency department with complaints of left ear 

possible abscess.  Reports he noticed a serna approximately 3 days ago.  His

girlfriend squeezed it last night got a lot of white stringy discharge out of 

the area.  Reports the area still is a little tender with touch or when he 

smiles.  Denies fever vomiting diarrhea.  Denies history of MRSA.  Works as a 

 does not wear ear buds. 


Associated Symptoms: None


Exacerbated by: Denies


Relieved by: Denies


Similar symptoms previously: No


Recently seen / treated by doctor: No





Past Medical History





- General


Information source: Patient





- Social History


Smoking Status: Unknown if Ever Smoked


Cigarette use (# per day): No


Frequency of alcohol use: None


Drug Abuse: None


Occupation: 


Family History: Arthritis, CAD, DM, Hyperlipidemia, Hypertension, Malignancy, 

Thyroid Disfunction


Pulmonary Medical History: Reports: Hx Asthma


Renal/ Medical History: Denies: Hx Peritoneal Dialysis


GI Medical History: Reports: Hx Gastroesophageal Reflux Disease


Musculoskeletal Medical History: Reports Hx Musculoskeletal Trauma


Traumatic Medical History: Reports: Hx Fractures


Past Surgical History: Reports: Hx Orthopedic Surgery - LEFT COLLARBONE





- Immunizations


Immunizations up to date: Yes


Hx Diphtheria, Pertussis, Tetanus Vaccination: Yes





Vertical Provider Document





- CONSTITUTIONAL


Agree With Documented VS: Yes


Exam Limitations: No Limitations


General Appearance: WD/WN, No Apparent Distress





- INFECTION CONTROL


TRAVEL OUTSIDE OF THE [place] IN LAST 30 DAYS: No





- HEENT


HEENT: Atraumatic, Normocephalic.  negative: Conjuctival Injection, Tympanic 

Membrane Red - small area of ecchymosis on helicis everardo/christine cynba area, no 

erythema no warmth no swelling no pustule no signs of an abscess, Tympanic 

Membrane Bulging





- NECK


Neck: Normal Inspection, Supple.  negative: Lymphadenopathy-Left, 

Lymphadenopathy-Right





- RESPIRATORY


Respiratory: Breath Sounds Normal, No Respiratory Distress





- CARDIOVASCULAR


Cardiovascular: Regular Rate





- MUSCULOSKELETAL/EXTREMETIES


Musculoskeletal/Extremeties: MAEW, FROM





- NEURO


Level of Consciousness: Awake, Alert, Appropriate


Motor/Sensory: No Motor Deficit





- DERM


Integumentary: Warm, Dry





Course





- Re-evaluation


Re-evalutation: 





08/26/19 09:27


38-year-old male presents emergency department with possible abscess in his left

ear.  Denies history of abscesses.  Denies MRSA.  No other complaints.  Site 

looks benign no signs of abdomen is little ecchymosis where area has been 

squeezed but no erythema no warmth no swelling no pustule.  Patient was 

instructed on signs and symptoms of abscess.  instructed to monitor area and 

return for abscess.  Verbalized understanding to all information














   Dictation of this chart was performed using voice recognition software; 

therefore, there may be some unintended grammatical errors.





- Vital Signs


Vital signs: 


                                        











Temp Pulse Resp BP Pulse Ox


 


 98.3 F   73   16   181/88 H  96 


 


 08/26/19 09:05  08/26/19 09:05  08/26/19 09:05  08/26/19 09:05  08/26/19 09:05














Discharge





- Discharge


Clinical Impression: 


 Irritation of left ear





Condition: Stable


Disposition: HOME, SELF-CARE


Additional Instructions: 


*You have been evaluated for ear irritation, possible abscess


*Monitor your ear for signs of an abscess such as redness swelling increased 

pain warmth pustule


*Tylenol or Motrin as indicated for pain


*Follow up with a primary care provider in 1 week for recheck or return to the 

emergency department as indicated


*Return to ED for worsening condition, changes, needs











Monitor your blood pressure. Your blood pressure was elevated today.  This may 

be because you were anxious, in pain or because you need medication.  It is 

important to follow up with your primary care provider for full evaluation.


Forms:  Elevated Blood Pressure

## 2020-01-08 ENCOUNTER — HOSPITAL ENCOUNTER (EMERGENCY)
Dept: HOSPITAL 62 - ER | Age: 40
Discharge: HOME | End: 2020-01-08
Payer: SELF-PAY

## 2020-01-08 VITALS — DIASTOLIC BLOOD PRESSURE: 90 MMHG | SYSTOLIC BLOOD PRESSURE: 161 MMHG

## 2020-01-08 DIAGNOSIS — R07.9: ICD-10-CM

## 2020-01-08 DIAGNOSIS — J45.909: ICD-10-CM

## 2020-01-08 DIAGNOSIS — R03.0: Primary | ICD-10-CM

## 2020-01-08 LAB
ADD MANUAL DIFF: NO
ALBUMIN SERPL-MCNC: 4.6 G/DL (ref 3.5–5)
ALP SERPL-CCNC: 75 U/L (ref 38–126)
ANION GAP SERPL CALC-SCNC: 11 MMOL/L (ref 5–19)
AST SERPL-CCNC: 44 U/L (ref 17–59)
BASOPHILS # BLD AUTO: 0 10^3/UL (ref 0–0.2)
BASOPHILS NFR BLD AUTO: 0.4 % (ref 0–2)
BILIRUB DIRECT SERPL-MCNC: 0.2 MG/DL (ref 0–0.4)
BILIRUB SERPL-MCNC: 0.8 MG/DL (ref 0.2–1.3)
BUN SERPL-MCNC: 11 MG/DL (ref 7–20)
CALCIUM: 9.9 MG/DL (ref 8.4–10.2)
CHLORIDE SERPL-SCNC: 102 MMOL/L (ref 98–107)
CO2 SERPL-SCNC: 26 MMOL/L (ref 22–30)
EOSINOPHIL # BLD AUTO: 0.1 10^3/UL (ref 0–0.6)
EOSINOPHIL NFR BLD AUTO: 0.8 % (ref 0–6)
ERYTHROCYTE [DISTWIDTH] IN BLOOD BY AUTOMATED COUNT: 13.1 % (ref 11.5–14)
GLUCOSE SERPL-MCNC: 85 MG/DL (ref 75–110)
HCT VFR BLD CALC: 42.6 % (ref 37.9–51)
HGB BLD-MCNC: 14.8 G/DL (ref 13.5–17)
LYMPHOCYTES # BLD AUTO: 2.6 10^3/UL (ref 0.5–4.7)
LYMPHOCYTES NFR BLD AUTO: 29.3 % (ref 13–45)
MCH RBC QN AUTO: 30.9 PG (ref 27–33.4)
MCHC RBC AUTO-ENTMCNC: 34.8 G/DL (ref 32–36)
MCV RBC AUTO: 89 FL (ref 80–97)
MONOCYTES # BLD AUTO: 0.5 10^3/UL (ref 0.1–1.4)
MONOCYTES NFR BLD AUTO: 6.1 % (ref 3–13)
NEUTROPHILS # BLD AUTO: 5.7 10^3/UL (ref 1.7–8.2)
NEUTS SEG NFR BLD AUTO: 63.4 % (ref 42–78)
PLATELET # BLD: 332 10^3/UL (ref 150–450)
POTASSIUM SERPL-SCNC: 4 MMOL/L (ref 3.6–5)
PROT SERPL-MCNC: 7.7 G/DL (ref 6.3–8.2)
RBC # BLD AUTO: 4.79 10^6/UL (ref 4.35–5.55)
TOTAL CELLS COUNTED % (AUTO): 100 %
WBC # BLD AUTO: 9 10^3/UL (ref 4–10.5)

## 2020-01-08 PROCEDURE — 71046 X-RAY EXAM CHEST 2 VIEWS: CPT

## 2020-01-08 PROCEDURE — 99285 EMERGENCY DEPT VISIT HI MDM: CPT

## 2020-01-08 PROCEDURE — 93010 ELECTROCARDIOGRAM REPORT: CPT

## 2020-01-08 PROCEDURE — 36415 COLL VENOUS BLD VENIPUNCTURE: CPT

## 2020-01-08 PROCEDURE — 85025 COMPLETE CBC W/AUTO DIFF WBC: CPT

## 2020-01-08 PROCEDURE — 80053 COMPREHEN METABOLIC PANEL: CPT

## 2020-01-08 PROCEDURE — 93005 ELECTROCARDIOGRAM TRACING: CPT

## 2020-01-08 PROCEDURE — 84484 ASSAY OF TROPONIN QUANT: CPT

## 2020-01-08 NOTE — ER DOCUMENT REPORT
ED Medical Screen (RME)





- General


Chief Complaint: Chest Pain


Stated Complaint: CHEST PAIN


Time Seen by Provider: 20 17:19


Mode of Arrival: Ambulatory


Information source: Patient


Notes: 





39-year-old morbidly obese  history of reflux presents to the 

emergency department with complaints of chest pain left side of his chest since 

this morning.  He reports feels uncomfortable.  Denies nausea fever vomiting 

diarrhea.  Denies pain radiating down his arm or up his neck.  Reports his 

father  of cardiac disease but he was in his 60s.  











[employer]


TRAVEL OUTSIDE OF THE [place] IN LAST 30 DAYS: No





- Related Data


Allergies/Adverse Reactions: 


                                        





No Known Allergies Allergy (Verified 20 17:14)


   








Home Medications: prilosec, baby aspirin, multivitamin





Past Medical History





- Social History


Chew tobacco use (# tins/day): No


Frequency of alcohol use: None


Drug Abuse: None


Pulmonary Medical History: Reports: Hx Asthma


Renal/ Medical History: Denies: Hx Peritoneal Dialysis


GI Medical History: Reports: Hx Gastroesophageal Reflux Disease


Musculoskeltal Medical History: Reports Hx Musculoskeletal Trauma


Traumatic Medical History: Reports: Hx Fractures


Past Surgical History: Reports: Hx Orthopedic Surgery - LEFT COLLARBONE





- Immunizations


Immunizations up to date: Yes


Hx Diphtheria, Pertussis, Tetanus Vaccination: Yes





Physical Exam





- Vital signs


Vitals: 





                                        











Temp Pulse Resp BP Pulse Ox


 


 98.2 F   85   16   179/112 H  98 


 


 20 16:08  20 16:08  20 16:08  20 16:08  20 16:08














Course





- Vital Signs


Vital signs: 





                                        











Temp Pulse Resp BP Pulse Ox


 


 98.2 F   85   16   179/112 H  98 


 


 20 16:08  20 16:08  20 16:08  20 16:08  20 16:08

## 2020-01-08 NOTE — RADIOLOGY REPORT (SQ)
EXAM DESCRIPTION:  CHEST 2 VIEWS



COMPLETED DATE/TIME:  1/8/2020 5:43 pm



REASON FOR STUDY:  cp



COMPARISON:  6/11/2017



EXAM PARAMETERS:  NUMBER OF VIEWS: two views

TECHNIQUE: Digital Frontal and Lateral radiographic views of the chest acquired.

RADIATION DOSE: NA

LIMITATIONS: none



FINDINGS:  LUNGS AND PLEURA: No opacities, masses or pneumothorax. No pleural effusion.

MEDIASTINUM AND HILAR STRUCTURES: No masses or contour abnormalities.

HEART AND VASCULAR STRUCTURES: Heart normal size.  No evidence for failure.

BONES: No acute findings.

HARDWARE: None in the chest.

OTHER: No other significant finding.



IMPRESSION:  NO ACUTE RADIOGRAPHIC FINDING IN THE CHEST.



TECHNICAL DOCUMENTATION:  JOB ID:  [id number]

 2011 [employer]- All Rights Reserved



Reading location - IP/workstation name: [email or web address]

## 2020-01-08 NOTE — ER DOCUMENT REPORT
ED General





- General


Chief Complaint: Chest Pain


Stated Complaint: CHEST PAIN


Time Seen by Provider: 01/08/20 17:19


Mode of Arrival: Ambulatory


TRAVEL OUTSIDE OF THE [place] IN LAST 30 DAYS: No





- HPI


Onset: Yesterday


Onset/Duration: Gradual


Severity: Moderate


Pain Level: 1


Context: 





39 year old male arrives with left sided chest pain almost like electricity that

lasts seconds in left sternal border.  No fever or chills.  No recent illness.  

No rash.  He denies associated symptoms - no sob or sweating or nausea or 

vomiting. 





- Related Data


Allergies/Adverse Reactions: 


                                        





No Known Allergies Allergy (Verified 01/08/20 17:14)


   








Home Medications: prilosec, baby aspirin, multivitamin





Past Medical History





- General


Information source: Patient





- Social History


Smoking Status: Never Smoker


Chew tobacco use (# tins/day): No


Frequency of alcohol use: None


Drug Abuse: None


Family History: Arthritis, CAD, DM, Hyperlipidemia, Hypertension, Malignancy, 

Thyroid Disfunction


Patient has suicidal ideation: No


Patient has homicidal ideation: No


Pulmonary Medical History: Reports: Hx Asthma


Renal/ Medical History: Denies: Hx Peritoneal Dialysis


GI Medical History: Reports: Hx Gastroesophageal Reflux Disease


Musculoskeletal Medical History: Reports Hx Musculoskeletal Trauma


Traumatic Medical History: Reports: Hx Fractures


Past Surgical History: Reports: Hx Orthopedic Surgery - LEFT COLLARBONE





- Immunizations


Immunizations up to date: Yes


Hx Diphtheria, Pertussis, Tetanus Vaccination: Yes





Review of Systems





- Review of Systems


Constitutional: No symptoms reported


EENT: No symptoms reported


Cardiovascular: No symptoms reported


Respiratory: No symptoms reported


Gastrointestinal: No symptoms reported


Genitourinary: No symptoms reported


Male Genitourinary: No symptoms reported


Musculoskeletal: No symptoms reported


Skin: No symptoms reported


Hematologic/Lymphatic: No symptoms reported


Neurological/Psychological: No symptoms reported





Physical Exam





- Vital signs


Vitals: 


                                        











Temp Pulse Resp BP Pulse Ox


 


 98.2 F   85   16   179/112 H  98 


 


 01/08/20 16:08  01/08/20 16:08  01/08/20 16:08  01/08/20 16:08  01/08/20 16:08











Interpretation: Normal





- General


General appearance: Appears well, Alert





- HEENT


Head: Normocephalic, Atraumatic


Eyes: Normal


Pupils: PERRL





- Respiratory


Respiratory status: No respiratory distress


Chest status: Nontender


Breath sounds: Normal


Chest palpation: Normal





- Cardiovascular


Rhythm: Regular


Heart sounds: Normal auscultation


Murmur: No





- Abdominal


Inspection: Normal


Distension: No distension


Bowel sounds: Normal


Tenderness: Nontender


Organomegaly: No organomegaly





- Back


Back: Normal, Nontender





- Extremities


General upper extremity: Normal inspection, Nontender, Normal color, Normal ROM,

Normal temperature


General lower extremity: Normal inspection, Nontender, Normal color, Normal ROM,

Normal temperature, Normal weight bearing.  No: Tabatha's sign





- Neurological


Neuro grossly intact: Yes


Cognition: Normal


Orientation: AAOx4


Ravin Coma Scale Eye Opening: Spontaneous


Ravin Coma Scale Verbal: Oriented


Ravin Coma Scale Motor: Obeys Commands


Fisher Coma Scale Total: 15


Speech: Normal


Motor strength normal: LUE, RUE, LLE, RLE


Sensory: Normal





- Psychological


Associated symptoms: Normal affect, Normal mood





- Skin


Skin Temperature: Warm


Skin Moisture: Dry


Skin Color: Normal





Course





- Re-evaluation


Re-evalutation: 





01/08/20 23:38


MDM  39 year old male with fleeting short duration chest pain at home.   Lasted 

seconds and occurred at 3 am today.  With several recurences but no pain here 

now.  I feel no second of enzymes is needed as the ekg and other ancillary 

studies are very reassuring. 





- Vital Signs


Vital signs: 


                                        











Temp Pulse Resp BP Pulse Ox


 


 97.9 F   68   41 H  150/91 H  99 


 


 01/08/20 23:33  01/08/20 22:47  01/08/20 23:15  01/08/20 23:15  01/08/20 23:15














- Laboratory


Result Diagrams: 


                                 01/08/20 18:11





                                 01/08/20 18:11





- Diagnostic Test


Radiology reviewed: Reports reviewed





- EKG Interpretation by Me


EKG shows normal: Sinus rhythm


Rate: Normal


Rhythm: NSR - NSR NL Axis 68 BPM no st elevaiton or depression my 

interpretation.





Discharge





- Discharge


Clinical Impression: 


 Elevated blood pressure reading





Condition: Good


Disposition: HOME, SELF-CARE


Instructions:  Chest Pain of Unclear Cause ([facility]), High Blood Pressure ([facility])


Additional Instructions: 


Your blood pressure was elevated here and should be monitored.  Please keep a 

log of this.  See your doctor in follow up and return here for any chest pain or

shortness of breath or other concerns.


Forms:  Return to School